# Patient Record
Sex: FEMALE | Race: WHITE | URBAN - METROPOLITAN AREA
[De-identification: names, ages, dates, MRNs, and addresses within clinical notes are randomized per-mention and may not be internally consistent; named-entity substitution may affect disease eponyms.]

---

## 2024-11-05 ENCOUNTER — OFFICE VISIT (OUTPATIENT)
Dept: FAMILY MEDICINE CLINIC | Age: 49
End: 2024-11-05
Payer: OTHER GOVERNMENT

## 2024-11-05 VITALS
OXYGEN SATURATION: 97 % | DIASTOLIC BLOOD PRESSURE: 66 MMHG | WEIGHT: 170 LBS | TEMPERATURE: 98.6 F | SYSTOLIC BLOOD PRESSURE: 106 MMHG | HEART RATE: 81 BPM | RESPIRATION RATE: 16 BRPM

## 2024-11-05 DIAGNOSIS — H66.001 NON-RECURRENT ACUTE SUPPURATIVE OTITIS MEDIA OF RIGHT EAR WITHOUT SPONTANEOUS RUPTURE OF TYMPANIC MEMBRANE: Primary | ICD-10-CM

## 2024-11-05 DIAGNOSIS — J01.90 ACUTE BACTERIAL SINUSITIS: ICD-10-CM

## 2024-11-05 DIAGNOSIS — R05.1 ACUTE COUGH: ICD-10-CM

## 2024-11-05 DIAGNOSIS — B96.89 ACUTE BACTERIAL SINUSITIS: ICD-10-CM

## 2024-11-05 PROCEDURE — 99203 OFFICE O/P NEW LOW 30 MIN: CPT | Performed by: NURSE PRACTITIONER

## 2024-11-05 RX ORDER — BENZONATATE 100 MG/1
100 CAPSULE ORAL 3 TIMES DAILY PRN
Qty: 30 CAPSULE | Refills: 0 | Status: SHIPPED | OUTPATIENT
Start: 2024-11-05 | End: 2024-11-15

## 2024-11-05 ASSESSMENT — ENCOUNTER SYMPTOMS
EYE DISCHARGE: 0
SHORTNESS OF BREATH: 0
EYE REDNESS: 0
COUGH: 1
VOMITING: 0
NAUSEA: 0
SINUS PRESSURE: 1
SINUS PAIN: 1

## 2024-11-05 NOTE — PROGRESS NOTES
[facility] PHYSICIANS [facility]  [address]  [address]  [facility] [address]  Dept: [phone]  Dept Fax: [phone]    Alida Aguirre is a 49 y.o. female who presents today for her medical conditions/complaints of   Chief Complaint   Patient presents with    Congestion     X 2 weeks worsening     Cough     X 1 day Productive    Sinus Problem     Sinus pressure, jaw pain    Ear Fullness          HPI:     /66   Pulse 81   Temp 98.6 °F (37 °C)   Resp 16   Wt 77.1 kg (170 lb)   SpO2 97%       HPI  Pt presented to the clinic today with c/o congestion. This is a new problem. The current episode started 2 weeks ago. Associated symptoms include: sinus pain/pressure, productive cough started yesterday, ears full/plugged, post nasal drip, teeth/jaw ache.  Pertinent negatives include: No fever, chills, SOB, chest pain .  Pt has tried OTC medications with little improvement.       No past medical history on file.     No past surgical history on file.    No family history on file.    Social History     Tobacco Use    Smoking status: Never    Smokeless tobacco: Never   Substance Use Topics    Alcohol use: Not on file        Prior to Visit Medications    Medication Sig Taking? Authorizing Provider   amoxicillin-clavulanate (AUGMENTIN) 875-125 MG per tablet Take 1 tablet by mouth 2 times daily for 10 days Yes Linda Guerrero APRN - CNP   benzonatate (TESSALON PERLES) 100 MG capsule Take 1 capsule by mouth 3 times daily as needed for Cough Yes Linda Guerrero APRN - CNP       No Known Allergies      Subjective:      Review of Systems   Constitutional:  Negative for fever.   HENT:  Positive for congestion, ear pain, postnasal drip, sinus pressure and sinus pain.    Eyes:  Negative for discharge and redness.   Respiratory:  Positive for cough. Negative for shortness of breath.    Cardiovascular:  Negative for chest pain.   Gastrointestinal: